# Patient Record
Sex: FEMALE | Race: WHITE | URBAN - METROPOLITAN AREA
[De-identification: names, ages, dates, MRNs, and addresses within clinical notes are randomized per-mention and may not be internally consistent; named-entity substitution may affect disease eponyms.]

---

## 2017-02-23 ENCOUNTER — TELEPHONE (OUTPATIENT)
Dept: TRANSPLANT | Facility: CLINIC | Age: 57
End: 2017-02-23

## 2017-02-23 NOTE — TELEPHONE ENCOUNTER
Call from dialysis social worker who notes that they haven't been sending PRA samples to [facility] on Juliane, they don't have kits.  I verified that Juliane is active on our kidney list and inactive on our SPK list (weight).  I am having  send the ALA order and kits to her.  Message left on Juliane's voicemail about this.  SW also gave me Juliane's current dry weight as of 2-14-17 @ 92 kg so current BMI 34 (OK for kidney, too high for SPK still).  I am handing this case off to Shaila; left message about coordinator change on Juliane's voicemail as well.

## 2017-03-09 ENCOUNTER — RESULTS ONLY (OUTPATIENT)
Dept: OTHER | Facility: CLINIC | Age: 57
End: 2017-03-09

## 2017-03-09 DIAGNOSIS — N18.6 ESRD (END STAGE RENAL DISEASE) (H): ICD-10-CM

## 2017-03-09 DIAGNOSIS — Z76.82 ORGAN TRANSPLANT CANDIDATE: ICD-10-CM

## 2017-03-09 PROCEDURE — 86832 HLA CLASS I HIGH DEFIN QUAL: CPT | Performed by: SURGERY

## 2017-03-09 PROCEDURE — 86833 HLA CLASS II HIGH DEFIN QUAL: CPT | Performed by: SURGERY

## 2017-03-16 LAB — PRA SINGLE ANTIGEN IGG ANTIBODY: NORMAL

## 2017-03-23 LAB
SA1 CELL: NORMAL
SA1 COMMENTS: NORMAL
SA1 HI RISK ABY: NORMAL
SA1 MOD RISK ABY: NORMAL
SA1 TEST METHOD: NORMAL
SA2 CELL: NORMAL
SA2 COMMENTS: NORMAL
SA2 HI RISK ABY UA: NORMAL
SA2 MOD RISK ABY: NORMAL
SA2 TEST METHOD: NORMAL

## 2017-06-08 ENCOUNTER — RESULTS ONLY (OUTPATIENT)
Dept: OTHER | Facility: CLINIC | Age: 57
End: 2017-06-08

## 2017-06-08 DIAGNOSIS — Z76.82 ORGAN TRANSPLANT CANDIDATE: ICD-10-CM

## 2017-06-08 DIAGNOSIS — N18.6 ESRD (END STAGE RENAL DISEASE) (H): ICD-10-CM

## 2017-06-08 PROCEDURE — 86833 HLA CLASS II HIGH DEFIN QUAL: CPT | Performed by: SURGERY

## 2017-06-08 PROCEDURE — 86832 HLA CLASS I HIGH DEFIN QUAL: CPT | Performed by: SURGERY

## 2017-06-14 LAB — PRA SINGLE ANTIGEN IGG ANTIBODY: NORMAL

## 2017-09-06 ENCOUNTER — RESULTS ONLY (OUTPATIENT)
Dept: OTHER | Facility: CLINIC | Age: 57
End: 2017-09-06

## 2017-09-06 DIAGNOSIS — Z76.82 ORGAN TRANSPLANT CANDIDATE: ICD-10-CM

## 2017-09-06 DIAGNOSIS — N18.6 ESRD (END STAGE RENAL DISEASE) (H): ICD-10-CM

## 2017-09-06 PROCEDURE — 86832 HLA CLASS I HIGH DEFIN QUAL: CPT | Performed by: SURGERY

## 2017-09-06 PROCEDURE — 86833 HLA CLASS II HIGH DEFIN QUAL: CPT | Performed by: SURGERY

## 2017-09-12 LAB — PRA SINGLE ANTIGEN IGG ANTIBODY: NORMAL

## 2017-12-07 ENCOUNTER — APPOINTMENT (OUTPATIENT)
Dept: LAB | Facility: CLINIC | Age: 57
End: 2017-12-07
Attending: TRANSPLANT SURGERY
Payer: MEDICARE

## 2017-12-07 ENCOUNTER — RESULTS ONLY (OUTPATIENT)
Dept: OTHER | Facility: CLINIC | Age: 57
End: 2017-12-07

## 2017-12-07 PROCEDURE — 86832 HLA CLASS I HIGH DEFIN QUAL: CPT | Performed by: SURGERY

## 2017-12-07 PROCEDURE — 86833 HLA CLASS II HIGH DEFIN QUAL: CPT | Performed by: SURGERY

## 2017-12-12 DIAGNOSIS — N18.6 ESRD (END STAGE RENAL DISEASE) (H): Primary | ICD-10-CM

## 2017-12-12 DIAGNOSIS — Z76.82 AWAITING ORGAN TRANSPLANT: ICD-10-CM

## 2018-01-08 ENCOUNTER — TELEPHONE (OUTPATIENT)
Dept: TRANSPLANT | Facility: CLINIC | Age: 58
End: 2018-01-08

## 2018-01-10 ENCOUNTER — TELEPHONE (OUTPATIENT)
Dept: TRANSPLANT | Facility: CLINIC | Age: 58
End: 2018-01-10

## 2018-01-30 ENCOUNTER — TELEPHONE (OUTPATIENT)
Dept: TRANSPLANT | Facility: CLINIC | Age: 58
End: 2018-01-30

## 2018-03-08 ENCOUNTER — RESULTS ONLY (OUTPATIENT)
Dept: OTHER | Facility: CLINIC | Age: 58
End: 2018-03-08

## 2018-03-08 DIAGNOSIS — N18.6 ESRD (END STAGE RENAL DISEASE) (H): ICD-10-CM

## 2018-03-08 DIAGNOSIS — Z76.82 AWAITING ORGAN TRANSPLANT: ICD-10-CM

## 2018-03-13 ENCOUNTER — TELEPHONE (OUTPATIENT)
Dept: TRANSPLANT | Facility: CLINIC | Age: 58
End: 2018-03-13

## 2018-03-15 LAB — PRA SINGLE ANTIGEN IGG ANTIBODY: NORMAL

## 2018-03-20 LAB
SA1 CELL: NORMAL
SA1 CELL: NORMAL
SA1 COMMENTS: NORMAL
SA1 COMMENTS: NORMAL
SA1 HI RISK ABY: NORMAL
SA1 HI RISK ABY: NORMAL
SA1 MOD RISK ABY: NORMAL
SA1 MOD RISK ABY: NORMAL
SA1 TEST METHOD: NORMAL
SA1 TEST METHOD: NORMAL
SA2 CELL: NORMAL
SA2 CELL: NORMAL
SA2 COMMENTS: NORMAL
SA2 COMMENTS: NORMAL
SA2 HI RISK ABY UA: NORMAL
SA2 HI RISK ABY UA: NORMAL
SA2 MOD RISK ABY: NORMAL
SA2 MOD RISK ABY: NORMAL
SA2 TEST METHOD: NORMAL
SA2 TEST METHOD: NORMAL
UNOS CPRA: 24

## 2018-04-04 ENCOUNTER — COMMITTEE REVIEW (OUTPATIENT)
Dept: TRANSPLANT | Facility: CLINIC | Age: 58
End: 2018-04-04

## 2018-04-04 ENCOUNTER — TELEPHONE (OUTPATIENT)
Dept: TRANSPLANT | Facility: CLINIC | Age: 58
End: 2018-04-04

## 2018-04-04 NOTE — COMMITTEE REVIEW
Abdominal Patient Discussion Note Transplant Coordinator: Shaila Jackson  Transplant Surgeon:       Referring Physician: Sandrita Cruz    Committee Review Members:  Nephrology Jaime Bañuelos MD, ANITA Landry CNP   Nutrition Noemy Mancia, RD   Transplant Umesh Emerson MD       Additional Discussion Notes and Findings:   Patient placed inactive status on the kidney list on 04/04/18 due to insurance issues; patient continues inactive status on the kidney/pancreas list due to: 1) BMI, and 2) Insurance issues.

## 2018-04-04 NOTE — LETTER
April 4, 2018    Juliane Gong  [address]  [place] [address]      Dear Ms. Gong,    This letter is sent to notify you that your listing status was changed from Active to Inactive on the kidney transplant waitlist at the [facility].  Your status was changed as of April 1, 2018, [facility] no longer has an agreement with your secondary insurance ([place] [employer]) insurance coverage.  Please note [facility] is still in discussions with [employer] to determine whether agreements can be made on a case by case basis.       We still need to be kept informed of any changes such as:    o changes in your insurance coverage  o change in your phone number, address or emergency contact  o significant changes in your health  o significant infections (such as pneumonia or abscesses)  o blood transfusions  o any condition which requires hospitalization or surgery    If you have any financial/insurance questions, please contact Debo Katz at [phone] as she is your assigned [facility]  [employer] .   If you have other questions, please contact your pre-transplant coordinator,  Shaila Jackson RN, at [phone].    Sincerely,        Kidney Transplant Program    Enclosures: UNOS Letter    CC: Tk Castro PCP; Keara Cruz; [facility]

## 2018-04-04 NOTE — TELEPHONE ENCOUNTER
Left voice message for patient to return my call; noted I am placing patient inactive status on the kidney list now, due to insurance issues; that as of 04/01/18 [facility] no longer has an agreement with [place] [employer], so if patient were call for kidney transplant and had the surgery, [facility] can bill patient's primary insurance, but can no longer bill [place] Medicaid, so patient would be responsible for charges not covered by primary insurance.

## 2018-06-05 ENCOUNTER — TRANSFERRED RECORDS (OUTPATIENT)
Dept: HEALTH INFORMATION MANAGEMENT | Facility: CLINIC | Age: 58
End: 2018-06-05

## 2018-06-09 ENCOUNTER — RESULTS ONLY (OUTPATIENT)
Dept: OTHER | Facility: CLINIC | Age: 58
End: 2018-06-09

## 2018-06-09 DIAGNOSIS — Z76.82 AWAITING ORGAN TRANSPLANT: ICD-10-CM

## 2018-06-09 DIAGNOSIS — N18.6 ESRD (END STAGE RENAL DISEASE) (H): ICD-10-CM

## 2018-06-18 LAB
PRA SINGLE ANTIGEN IGG ANTIBODY: NORMAL
SA1 CELL: NORMAL
SA1 COMMENTS: NORMAL
SA1 HI RISK ABY: NORMAL
SA1 MOD RISK ABY: NORMAL
SA1 TEST METHOD: NORMAL
SA2 CELL: NORMAL
SA2 COMMENTS: NORMAL
SA2 HI RISK ABY UA: NORMAL
SA2 MOD RISK ABY: NORMAL
SA2 TEST METHOD: NORMAL

## 2018-10-12 ENCOUNTER — TRANSFERRED RECORDS (OUTPATIENT)
Dept: HEALTH INFORMATION MANAGEMENT | Facility: CLINIC | Age: 58
End: 2018-10-12

## 2019-01-22 ENCOUNTER — TRANSFERRED RECORDS (OUTPATIENT)
Dept: HEALTH INFORMATION MANAGEMENT | Facility: CLINIC | Age: 59
End: 2019-01-22

## 2019-02-07 ENCOUNTER — TELEPHONE (OUTPATIENT)
Dept: TRANSPLANT | Facility: CLINIC | Age: 59
End: 2019-02-07

## 2019-02-07 NOTE — TELEPHONE ENCOUNTER
Call from dialysis social worker Lang at [facility].  He states that Juliane never transferred to a different transplant program when she had to go inactive here because of [place].  Now that they are back to covering transplant at [facility] she is ready to get back on track to get active on our list(s).  He reports no changes in her condition; their unit feels she is a reasonable transplant candidate.  I will pass this message to Shaila to make contact with her.

## 2019-02-11 ENCOUNTER — TELEPHONE (OUTPATIENT)
Dept: TRANSPLANT | Facility: CLINIC | Age: 59
End: 2019-02-11

## 2019-03-19 ENCOUNTER — TELEPHONE (OUTPATIENT)
Dept: TRANSPLANT | Facility: CLINIC | Age: 59
End: 2019-03-19

## 2019-04-02 ENCOUNTER — TELEPHONE (OUTPATIENT)
Dept: TRANSPLANT | Facility: CLINIC | Age: 59
End: 2019-04-02

## 2019-04-04 ENCOUNTER — TRANSFERRED RECORDS (OUTPATIENT)
Dept: HEALTH INFORMATION MANAGEMENT | Facility: CLINIC | Age: 59
End: 2019-04-04

## 2019-04-04 ASSESSMENT — MIFFLIN-ST. JEOR: SCORE: 1372.74

## 2019-04-11 ENCOUNTER — TELEPHONE (OUTPATIENT)
Dept: TRANSPLANT | Facility: CLINIC | Age: 59
End: 2019-04-11

## 2019-04-11 NOTE — TELEPHONE ENCOUNTER
Reviewed patient and I have exchanged voice messages, awaiting return call from patient.  Lang confirms patient dialyzes [place], Sat, starting at 12:10 PM, so I will try to give her a call at dialysis next week, if I don't receive a return call from her.  Also requested Lang have unit fax me copies of most recent provider progress notes and patient's weight (when coming in for treatment).

## 2019-04-15 ENCOUNTER — TELEPHONE (OUTPATIENT)
Dept: TRANSPLANT | Facility: CLINIC | Age: 59
End: 2019-04-15

## 2019-04-15 ENCOUNTER — DOCUMENTATION ONLY (OUTPATIENT)
Dept: TRANSPLANT | Facility: CLINIC | Age: 59
End: 2019-04-15

## 2019-04-15 VITALS — BODY MASS INDEX: 32.42 KG/M2 | HEIGHT: 63 IN | WEIGHT: 182.98 LBS

## 2019-04-15 DIAGNOSIS — E11.9 DIABETES MELLITUS, TYPE 2 (H): ICD-10-CM

## 2019-04-15 DIAGNOSIS — Z76.82 ORGAN TRANSPLANT CANDIDATE: ICD-10-CM

## 2019-04-15 DIAGNOSIS — N18.6 ESRD (END STAGE RENAL DISEASE) (H): ICD-10-CM

## 2019-04-15 NOTE — TELEPHONE ENCOUNTER
"Reviewed patient remains Inactive status on the kidney and kidney/pancreas lists at [facility]; that she continues to accumulate waiting time on the lists, but before she may be considered for Active status, she will need to come to [facility] for waitlist update appointments including but not limited to: 1) Transplant surgeon; 2) ealth cardiology and stress test; 3) Social work; and 4) Lab appointment.  Patient reports she had a mammogram in 2018, sees Dr. Dev Contreras for primary care since Dr. Castro retired.  She also reports being hospitalized last year in [facility] for COPD.  She confirms she uses inhaler for the COPD and \"I did see a lung specialist once between the time Dr. Castro retired and seeing Dr. Contreras.\" Reviewed will have Dr. Contreras's current clinic notes, mammogram report and 2018 hospital discharge summary (COPD).  Will send a request to our  to contact Juliane to schedule waitlist return/update appointments and tests.  Patient verbalizes agreement with this plan.  "

## 2019-04-17 ENCOUNTER — MEDICAL CORRESPONDENCE (OUTPATIENT)
Dept: TRANSPLANT | Facility: CLINIC | Age: 59
End: 2019-04-17

## 2019-06-06 ENCOUNTER — TRANSFERRED RECORDS (OUTPATIENT)
Dept: HEALTH INFORMATION MANAGEMENT | Facility: CLINIC | Age: 59
End: 2019-06-06

## 2019-10-22 ENCOUNTER — TRANSFERRED RECORDS (OUTPATIENT)
Dept: HEALTH INFORMATION MANAGEMENT | Facility: CLINIC | Age: 59
End: 2019-10-22

## 2019-10-23 ENCOUNTER — TRANSFERRED RECORDS (OUTPATIENT)
Dept: HEALTH INFORMATION MANAGEMENT | Facility: CLINIC | Age: 59
End: 2019-10-23

## 2019-12-16 ENCOUNTER — TELEPHONE (OUTPATIENT)
Dept: TRANSPLANT | Facility: CLINIC | Age: 59
End: 2019-12-16

## 2019-12-19 ENCOUNTER — TRANSFERRED RECORDS (OUTPATIENT)
Dept: HEALTH INFORMATION MANAGEMENT | Facility: CLINIC | Age: 59
End: 2019-12-19

## 2020-01-09 ENCOUNTER — TELEPHONE (OUTPATIENT)
Dept: TRANSPLANT | Facility: CLINIC | Age: 60
End: 2020-01-09

## 2020-01-09 NOTE — TELEPHONE ENCOUNTER
Reviewed with Lang that I left a voice message on 12/16/19, but patient hasn't yet returned my call.  Lang with request the patient return my call (provided my direct contact information).

## 2020-01-13 ENCOUNTER — TELEPHONE (OUTPATIENT)
Dept: TRANSPLANT | Facility: CLINIC | Age: 60
End: 2020-01-13

## 2020-01-13 NOTE — TELEPHONE ENCOUNTER
"Reviewed with patient that I made attempts to contact her and left voice messages, but when dialysis MSW called me last week, learned patient had a different phone number.  Confirmed patient's current address.  Explained received dialysis progress notes that document patient periodically misses treatments.   Patient reports she has requested a different chair on multiple occasions as current chair is \"very painful so sit in\" for dialysis treatments, \"So I called them today and told them I'm not coming today.\"  Reviewed the importance that patient not miss dialysis treatments as this will be a concern for after transplant; whether patient will follow transplant provider recommendations.  Encouraged patient to discuss with Lang (dialysis MSW) as patient reports she did not discuss different dialysis chair with Lang last week. Since patient reports having scheduled appointment with Dr. Contreras (PCP) on 01/20/2020, will have this visit note and prior visit note from 2019 note requested after 01/20/2020.   After 01/20/2020,will send scheduling request to scheduling team to contact patient to schedule return waitlist update appointments at [facility]. Encouraged patient to contact me with questions, concerns, or additional contact information changes.  Patient verbalizes she understands all information and agrees with current plan.   "

## 2020-07-29 ENCOUNTER — TRANSFERRED RECORDS (OUTPATIENT)
Dept: HEALTH INFORMATION MANAGEMENT | Facility: CLINIC | Age: 60
End: 2020-07-29

## 2020-09-28 ENCOUNTER — TELEPHONE (OUTPATIENT)
Dept: TRANSPLANT | Facility: CLINIC | Age: 60
End: 2020-09-28

## 2020-09-28 NOTE — TELEPHONE ENCOUNTER
Returned Lang's call.  Left voice message that I need to have records from patient's PCP re-requested.  Also requested Lang have unit staff fax copy of current nephrologist progress note and dialysis attendance report for the past few months.  Previously, patient was missing dialysis treatments as patient reported it was too painful to sit in the dialysis chair.

## 2021-02-23 ENCOUNTER — TELEPHONE (OUTPATIENT)
Dept: TRANSPLANT | Facility: CLINIC | Age: 61
End: 2021-02-23

## 2021-02-23 ENCOUNTER — TRANSFERRED RECORDS (OUTPATIENT)
Dept: HEALTH INFORMATION MANAGEMENT | Facility: CLINIC | Age: 61
End: 2021-02-23

## 2021-02-23 NOTE — TELEPHONE ENCOUNTER
Patient reports still interested in transplant, but has had an ongoing cold and vomiting; reports has an appointment with her primary care clinic on 02/26/2021.  Requested patient have this clinic fax copies of most recent mammogram and colonoscopy reports as well as 02/26/2021 clinic notes.  Reviewed once I have insurance verification from patient's assigned [facility], will send scheduling request for return in-person visits with transplant surgeon and social work team member as well as a lab appointment.  Provided patient with living donor website/contact information for potential donors to register.  Also provided my contact information additional questions.

## 2021-02-25 ENCOUNTER — TRANSFERRED RECORDS (OUTPATIENT)
Dept: HEALTH INFORMATION MANAGEMENT | Facility: CLINIC | Age: 61
End: 2021-02-25
Payer: MEDICARE

## 2021-02-25 DIAGNOSIS — E11.9 DIABETES MELLITUS, TYPE 2 (H): ICD-10-CM

## 2021-02-25 DIAGNOSIS — Z76.82 ORGAN TRANSPLANT CANDIDATE: ICD-10-CM

## 2021-02-25 DIAGNOSIS — N18.6 ESRD (END STAGE RENAL DISEASE) (H): ICD-10-CM

## 2021-02-26 ENCOUNTER — TRANSFERRED RECORDS (OUTPATIENT)
Dept: HEALTH INFORMATION MANAGEMENT | Facility: CLINIC | Age: 61
End: 2021-02-26

## 2021-03-08 ENCOUNTER — TELEPHONE (OUTPATIENT)
Dept: TRANSPLANT | Facility: CLINIC | Age: 61
End: 2021-03-08

## 2021-03-08 NOTE — TELEPHONE ENCOUNTER
Called patient to review scheduling team has been trying to reach her, but no answer and no voice mail.  Patient reports she does have voice mail.  She also notes best to call her after 10AM on non-dialysis days.  Disconnected call with patient and called her number back.  I was able to leave the patient a voice message.  Request to scheduling team to call patient after 10AM.

## 2021-04-08 ENCOUNTER — TELEPHONE (OUTPATIENT)
Dept: TRANSPLANT | Facility: CLINIC | Age: 61
End: 2021-04-08

## 2021-04-08 NOTE — TELEPHONE ENCOUNTER
Left voice message for patient requesting she return scheduling team's call as voice messages were left by scheduling March 9th, March 24th, and April 8th.  Also left my contact information for questions.

## 2021-06-21 ENCOUNTER — TELEPHONE (OUTPATIENT)
Dept: TRANSPLANT | Facility: CLINIC | Age: 61
End: 2021-06-21

## 2021-06-21 NOTE — TELEPHONE ENCOUNTER
Left voice message for patient.  Is she still interested in scheduling return waitlist?  Requested return call.

## 2021-07-22 ENCOUNTER — TELEPHONE (OUTPATIENT)
Dept: TRANSPLANT | Facility: CLINIC | Age: 61
End: 2021-07-22

## 2021-07-22 NOTE — TELEPHONE ENCOUNTER
Reviewed our scheduling team and I have made several attempts to contact patient, but she hasn't returned calls.  Would like to confirm whether patient remains interested in transplant or not.  Kerri reports she will see the patient later today and will discuss with her.  Provided my contact information.

## 2021-08-05 ENCOUNTER — TELEPHONE (OUTPATIENT)
Dept: TRANSPLANT | Facility: CLINIC | Age: 61
End: 2021-08-05

## 2021-09-27 ENCOUNTER — TELEPHONE (OUTPATIENT)
Dept: TRANSPLANT | Facility: CLINIC | Age: 61
End: 2021-09-27

## 2021-09-27 NOTE — TELEPHONE ENCOUNTER
Patient reports she is still interested in having a transplant; reports she has been dealing with back pain and was hospitalized the week befor e last at [facility], but doesn't respond when I noted I have left several voice messages for her over the past few months.  Patient confirms she had a colonoscopy this year in [place], but doesn't remember the exact date or month and that she has a hospital follow-up appt with her PCP on 10/04/2021.  Requested patient have PCP fax a copy of 10/04/2021 visit notes and provided the transplant office fax number.  Noted I will send another message to our scheduling team to try to call the patient again, but noted she needs to return their calls if message are left. Otherwise, we can't move forward with return waitlist appointments.  Patient verbalizes she understands all information and is agreeable with this plan.

## 2021-12-13 DIAGNOSIS — Z76.82 ORGAN TRANSPLANT CANDIDATE: ICD-10-CM

## 2021-12-13 DIAGNOSIS — G47.33 OBSTRUCTIVE SLEEP APNEA: ICD-10-CM

## 2021-12-13 DIAGNOSIS — N18.6 END STAGE RENAL DISEASE (H): ICD-10-CM

## 2021-12-13 DIAGNOSIS — I10 ESSENTIAL HYPERTENSION: ICD-10-CM

## 2021-12-13 DIAGNOSIS — E11.9 DIABETES MELLITUS, TYPE 2 (H): ICD-10-CM

## 2021-12-14 ENCOUNTER — TELEPHONE (OUTPATIENT)
Dept: TRANSPLANT | Facility: CLINIC | Age: 61
End: 2021-12-14
Payer: MEDICARE

## 2022-01-18 ENCOUNTER — TELEPHONE (OUTPATIENT)
Dept: TRANSPLANT | Facility: CLINIC | Age: 62
End: 2022-01-18
Payer: MEDICARE

## 2022-01-24 ENCOUNTER — TELEPHONE (OUTPATIENT)
Dept: TRANSPLANT | Facility: CLINIC | Age: 62
End: 2022-01-24
Payer: MEDICARE

## 2022-01-24 NOTE — TELEPHONE ENCOUNTER
"Called patient, but unable to leave a message as recording, \"The mailbox is full and cannot accept any messages at this time.  Goodbye.\"  "

## 2022-01-25 ENCOUNTER — TELEPHONE (OUTPATIENT)
Dept: TRANSPLANT | Facility: CLINIC | Age: 62
End: 2022-01-25
Payer: MEDICARE

## 2022-01-25 NOTE — TELEPHONE ENCOUNTER
Unit doesn't have a portable phone to give to the patient, so I provided my contact information and Jaci reports she will give the patient a message to call me.

## 2022-01-25 NOTE — TELEPHONE ENCOUNTER
"Called patient.  Unable to leave a voice message per recording, \"The mailbox is full and cannot accept messages at this time.  Goodbye.\"  "

## 2022-02-14 ENCOUNTER — TELEPHONE (OUTPATIENT)
Dept: TRANSPLANT | Facility: CLINIC | Age: 62
End: 2022-02-14
Payer: MEDICARE

## 2022-02-14 NOTE — TELEPHONE ENCOUNTER
"Reviewed attempts made to contact patient by our scheduling team and I also contacted the patient unable to leave voice message. Patient reports her phone wasn't working and she wasn't able to receive calls. She also notes she will be moving to a different city at the end of this month, but \"I don't have the address in front of me.\"  She reports she was hospitalized last month at [facility] for COPD, but doesn't recall the specific dates, \"I was in from a Monday through Sunday.\"  Reviewed will need to submit a request for these records.  Patient reports with moving, she is not ready to schedule return waitlist appointments, so will plan to contact her in March for an update.  Patient verbalizes agreement with this plan.   "
Abdomen soft, nontender, nondistended, bowel sounds present in all 4 quadrants.

## 2022-02-23 ENCOUNTER — DOCUMENTATION ONLY (OUTPATIENT)
Dept: TRANSPLANT | Facility: CLINIC | Age: 62
End: 2022-02-23

## 2022-03-14 ENCOUNTER — TELEPHONE (OUTPATIENT)
Dept: TRANSPLANT | Facility: CLINIC | Age: 62
End: 2022-03-14
Payer: MEDICARE

## 2022-03-14 NOTE — TELEPHONE ENCOUNTER
"\"We're sorry your call cannot be completed at this time.  Please hang-up and try your call again later.\"  "

## 2022-03-16 ENCOUNTER — TELEPHONE (OUTPATIENT)
Dept: TRANSPLANT | Facility: CLINIC | Age: 62
End: 2022-03-16
Payer: MEDICARE

## 2022-03-16 NOTE — TELEPHONE ENCOUNTER
Keyla reports she doesn't have a current phone number for the patient since the patient moved and is now dialyzing at the [facility] unit in [place], [place], phone number [phone].

## 2022-03-16 NOTE — TELEPHONE ENCOUNTER
After Visit Summary   11/26/2018    Mely Guerra    MRN: [id number]           Patient Information     Date Of Birth          1942        Visit Information        Provider Department      11/26/2018 9:15 AM Juan York MD [facility]        Today's Diagnoses     Palpitations    -  1    Wenckebach second degree AV block           Follow-ups after your visit        Future tests that were ordered for you today     Open Future Orders        Priority Expected Expires Ordered    Holter Monitor 24 hour - Adult Routine 12/3/2018 11/26/2019 11/26/2018    Exercise Stress Echocardiogram Routine 12/3/2018 11/26/2019 11/26/2018            Who to contact     If you have questions or need follow up information about today's clinic visit or your schedule please contact [facility] directly at [phone].  Normal or non-critical lab and imaging results will be communicated to you by MyChart, letter or phone within 4 business days after the clinic has received the results. If you do not hear from us within 7 days, please contact the clinic through [employer] or phone. If you have a critical or abnormal lab result, we will notify you by phone as soon as possible.  Submit refill requests through [employer] or call your pharmacy and they will forward the refill request to us. Please allow 3 business days for your refill to be completed.          Additional Information About Your Visit        MyChart Information     [employer] gives you secure access to your electronic health record. If you see a primary care provider, you can also send messages to your care team and make appointments. If you have questions, please call your primary care clinic.  If you do not have a primary care provider, please call [phone] and they will assist you.        Care EveryWhere ID     This is your Care EveryWhere ID. This could be used by other  Sylwia reports patient has been dialyzing at this unit for a few weeks, has missed 2 treatments=1 related to bad weather and the 2nd as patient reported feeling ill.  Sylwia also reports from patient's previous dialysis unit, there were some challenges with dialysis and medication compliance.  Sylwia will have the unit  contact me to review.    organizations to access your [place] medical records  [id number]        Your Vitals Were     Pulse Pulse Oximetry BMI (Body Mass Index)             64 99% 21.58 kg/m2          Blood Pressure from Last 3 Encounters:   11/26/18 135/74   11/21/18 112/64   10/31/18 119/83    Weight from Last 3 Encounters:   11/26/18 58.8 kg (129 lb 11.2 oz)   11/21/18 58.5 kg (129 lb)   10/31/18 56.2 kg (124 lb)              We Performed the Following     EKG 12-lead complete w/read - Clinics (performed today)        Primary Care Provider Office Phone # Fax #    Tiffany Messer [phone] [address] [address]        Equal Access to Services     CIRILO BAUER : Hadii chris davis hadasho Sogeovanna, waaxda luqadaha, qaybta kaalmada adeegyada, erik greene . So [facility] [phone].    ATENCIÓN: Si habla español, tiene a mcfadden disposición servicios gratuitos de asistencia lingüística. Jostin al [phone].    We comply with applicable federal civil rights laws and [place] laws. We do not discriminate on the basis of race, color, national origin, age, disability, sex, sexual orientation, or gender identity.            Thank you!     Thank you for choosing [facility]  for your care. Our goal is always to provide you with excellent care. Hearing back from our patients is one way we can continue to improve our services. Please take a few minutes to complete the written survey that you may receive in the mail after your visit with us. Thank you!             Your Updated Medication List - Protect others around you: Learn how to safely use, store and throw away your medicines at www.disposemymeds.org.          This list is accurate as of 11/26/18 10:03 AM.  Always use your most recent med list.                   Brand Name Dispense Instructions for use Diagnosis    ADVIL PM PO      Take 2 tablets by mouth At Bedtime        ALEVE PO       Take 220 mg by mouth Takes every AM and PM when not taking ibuprofen.        amLODIPine 10 MG tablet    NORVASC    90 tablet    Take 1 tablet (10 mg) by mouth daily    Essential hypertension with goal blood pressure less than 140/90       CALCIUM CITRATE + D PO      Take 2 tablets by mouth 2 times daily        doxycycline monohydrate 100 MG capsule    MONDOXYNE NL    14 capsule    Take bid for 7 days    LRTI (lower respiratory tract infection), Acute sinusitis, recurrence not specified, unspecified location       FISH OIL PO      1,000 mg 2 times daily        fluticasone 110 MCG/ACT inhaler    FLOVENT HFA    1 Inhaler    Inhale 1 puff into the lungs 2 times daily    Wheezing       fluticasone 50 MCG/ACT spray    FLONASE    48 g    Spray 2 sprays into both nostrils daily    Other allergic rhinitis       levothyroxine 100 MCG tablet    SYNTHROID/LEVOTHROID    90 tablet    TAKE 1 TABLET EVERY DAY    Acquired hypothyroidism       losartan 100 MG tablet    COZAAR    90 tablet    Take 1 tablet (100 mg) by mouth daily    Essential hypertension with goal blood pressure less than 140/90       * PRESERVISION AREDS 2 PO      Take 1 tablet by mouth 2 times daily        * MULTIVITAMIN ADULT PO      Take 1 tablet by mouth daily        triamterene-hydrochlorothiazide 37.5-25 MG per capsule    DYAZIDE    90 capsule    TAKE 1 CAPSULE EVERY DAY    Essential hypertension with goal blood pressure less than 140/90       ZANTAC 150 MAXIMUM STRENGTH PO           * Notice:  This list has 2 medication(s) that are the same as other medications prescribed for you. Read the directions carefully, and ask your doctor or other care provider to review them with you.

## 2022-03-17 ENCOUNTER — TRANSFERRED RECORDS (OUTPATIENT)
Dept: HEALTH INFORMATION MANAGEMENT | Facility: CLINIC | Age: 62
End: 2022-03-17
Payer: MEDICARE

## 2022-03-17 ENCOUNTER — TELEPHONE (OUTPATIENT)
Dept: TRANSPLANT | Facility: CLINIC | Age: 62
End: 2022-03-17
Payer: MEDICARE

## 2022-03-17 NOTE — TELEPHONE ENCOUNTER
Chitra reports patient has moved in with her niece, Agata as patient was hospitalized and may not have been taking care of herself as well; that Agata is very involved with her care.  Chitra also reports patient has established care with new PCP, Dr. Stef Mcguire, [facility] and had an appointment earlier today to assist with referral for additional home health services.  Chitra reports patient is currently using a walker and current body mass index is 24.  Explained patient remains inactive status on the kidney and kidney/pancreas transplant lists.  When I last spoke with the patient, she wanted to delay scheduling updated appointments with our transplant team until she completed her move.  Chitra noted the phone number she has for the patient is [phone].  Noted I will try to contact patient at this number and if I'm unable to reach, will attempt to contact Agata.  Chitra reports Agata does work the 2nd and 3rd shifts.

## 2022-03-22 ENCOUNTER — TELEPHONE (OUTPATIENT)
Dept: TRANSPLANT | Facility: CLINIC | Age: 62
End: 2022-03-22
Payer: MEDICARE

## 2022-03-22 NOTE — TELEPHONE ENCOUNTER
Left voice message requesting Agata have General Blood contact me as I tried calling General Blood's cell phone, but was unable to leave a voice message.

## 2022-03-22 NOTE — TELEPHONE ENCOUNTER
Agata reports she is managing Juliane's mail as she reports Juliane wasn't opening her mail and not paying some of her bills; that she forgets things.  Noted I will have a consent form sent to Juliane in care of Agata for Juliane to sign and list names of people she authorizes us to contact and share medical information.  Agata reports she will watch for the information.

## 2022-05-05 ENCOUNTER — TELEPHONE (OUTPATIENT)
Dept: TRANSPLANT | Facility: CLINIC | Age: 62
End: 2022-05-05
Payer: MEDICARE

## 2022-05-18 ENCOUNTER — TELEPHONE (OUTPATIENT)
Dept: TRANSPLANT | Facility: CLINIC | Age: 62
End: 2022-05-18
Payer: MEDICARE

## 2022-05-18 NOTE — TELEPHONE ENCOUNTER
Agata reports Juliane is currently hospitalized with low oxygen levels and may be discharging with supplemental oxygen.  Reviewed I am able to follow Juliane's progress in our records sharing system for the current hospitalization.   Explained before scheduling return appointments with our transplant providers, Juliane will need to be independent of supplemental oxygen.  Also explained before being considered for transplant, Juliane will need to be current with mammograms, pap smears, and colonoscopies.  As Agata reports Juliane had a stress test at [facility] a few weeks ago, requested Agata contact [facility] and ask that copy of the stress test report be faxed to the transplant office.  Will plan to contact Agata/Juliane in a month or so for an update, but encouraged Agata to contact me before that time with questions or if Juliane is not needing supplemental oxygen.  Agata verbalizes agreement with this plan.

## 2022-06-27 ENCOUNTER — TELEPHONE (OUTPATIENT)
Dept: TRANSPLANT | Facility: CLINIC | Age: 62
End: 2022-06-27

## 2022-06-27 NOTE — TELEPHONE ENCOUNTER
Agata reports Juliane wants to be removed from the transplant waitlists; that Juliane wishes continue as she is; that this has been discussed with her dialysis unit.  Agata reports Juliane dialyzes between 9:20 AM - 1:20 PM Tues/Thurs/Sat, so I will call her during dialysis to review her request and also contact her nephrologist.  Once have confirmed, will review Juliane's request with the transplant committee.

## 2022-06-28 ENCOUNTER — TELEPHONE (OUTPATIENT)
Dept: TRANSPLANT | Facility: CLINIC | Age: 62
End: 2022-06-28

## 2022-06-28 NOTE — TELEPHONE ENCOUNTER
Patient reports she is no longer interested in having a transplant, but unsure whether her nephrologist is aware.    I then spoke with SANDRA Murphy who helps with transplant coordination and also confirms Dr. Rogers may not be aware of patient's wishes, but likely will agree.  Jeffrey will send an e-mail to Dr. Rogers, CC the dialysis social worker and myself regarding patient's request not to have a transplant.  Will watch for Dr. Rogers's reply and then review with the multi-disciplinary transplant committee for removal from the waitlist.

## 2022-08-25 ENCOUNTER — TELEPHONE (OUTPATIENT)
Dept: TRANSPLANT | Facility: CLINIC | Age: 62
End: 2022-08-25

## 2022-08-25 NOTE — TELEPHONE ENCOUNTER
Left voice message requesting return call from unit.  I sent e-mail to providers per request to review whether in agreement with patient's request to not move forward with kidney or kidney/pancreas transplant, but haven't received a response.

## 2022-08-30 ENCOUNTER — TELEPHONE (OUTPATIENT)
Dept: TRANSPLANT | Facility: CLINIC | Age: 62
End: 2022-08-30

## 2022-08-30 NOTE — TELEPHONE ENCOUNTER
Sylwia reports Dr. Rogers is aware of patient's request to be removed from the transplant lists and is okay with this request.

## 2022-08-31 ENCOUNTER — COMMITTEE REVIEW (OUTPATIENT)
Dept: TRANSPLANT | Facility: CLINIC | Age: 62
End: 2022-08-31

## 2022-08-31 NOTE — LETTER
August 31, 2022    Julianeliban Gong   [address] [address]      Dear Ms. Gong,    The purpose of this letter is to let you know the [facility] Multi-Disciplinary Selection Team made the decision to remove you from the kidney and  kidney and pancreas transplant lisst.  This is because you have reported you are not interested in having a transplant and have requested to be removed from the transplant waitlists.   Important things you should know:    If you would like to discuss the decision, you may call [phone], option 5 and ask to speak to your transplant coordinator.    We recommend that you continue to follow up with your primary care and referring physicians in order to manage your health concerns.  Enclosed is a letter from [facility] which describes the services offered to patients by [facility] and the Organ Procurement and Transplantation Network.  Thank you for allowing us to participate in your care.  We wish you well.    Sincerely,  Kidney and Pancreas Transplant Team  ealth/Brice    Enclosure:  OS Letter   CC:   Care Team              The Organ Procurement and Transplantation Network  Toll-free patient services line:     Your resource for organ transplant information    If you have a question regarding your own medical care, you always should call your transplant hospital first. However, for general organ transplant-related information, you can call the Organ Procurement and Transplantation Network (OPTN) toll-free patient services line at [phone]. Anyone, including potential transplant candidates, candidates, recipients, family members, friends, living donors, and donor family members, can call this number to:          Talk about organ donation, living donation, the transplant process, the donation process, and transplant policies.    Get a free patient information kit with helpful booklets, waiting list and transplant information, and a list of all transplant  hospitals.    Ask questions about the OPTN website (https://optn.transplant.hrsa.gov/), the United Network for Organ Sharing s (UNOS) website (https://unos.org/), or the UNOS website for living donors and transplant recipients. (https://www.transplantliving.org/).    Learn how the OPTN can help you.    Talk about any concerns that you may have with a transplant hospital.    The [facility] transplant system, the OPTN, is managed under federal contract by the [employer] Network for Organ Sharing (UNOS), which is a non-profit charitable organization. The OPTN helps create and define organ sharing policies that make the best use of donated organs. This process continuously evaluating new advances and discoveries so policies can be adapted to best serve patients waiting for transplants. To do so, the OPTN works closely with transplant professionals, transplant patients, transplant candidates, donor families, living donors, and the public. All transplant programs and organ procurement organizations throughout the country are OPTN members and are obligated to follow the policies the OPTN creates for allocating organs.    The OPTN also is responsible for:      Providing educational material for patients, the public, and professionals.    Raising awareness of the need for donated organs and tissue.    Coordinating organ procurement, matching, and placement.    Collecting information about every organ transplant and donation that occurs in the [place].                          Remember, you should contact your transplant hospital directly if you have questions or concerns about your own medical care including medical records, work-up progress, and test results.    We are not your transplant hospital, and our staff will not be able to answer questions about your case, so please keep your transplant hospital s phone number handy.    However, while you research your transplant needs and learn as much as you can about  transplantation and donation, we welcome your call to our toll-free patient services line at [phone].          Updated 4/1/2019

## 2022-08-31 NOTE — COMMITTEE REVIEW
Abdominal Patient Discussion Note Transplant Coordinator: Shaila Jackson  Transplant Surgeon:       Referring Physician: Sandrita Cruz    Committee Review Members:  Nephrology Eduar Lindsey MD, Irma Peacock MD, Jorge Alberto Baker MD   Pharmacy Jorje Almanza, [facility]    - Clinical Gini Estelle Rahman, [facility]   Transplant Khadijah Sol PA-C, Shaila Jackson, RN, Ernestine Angel, RN, Astrid Dent, SANDRA, Umesh Emerson MD, Mae Reed, SANDRA, Jessica Cummings RN   Transplant Surgery Umesh Emerson MD       Additional Discussion Notes and Findings:   Review of patient's current medical information, patient request to not have an organ transplant, and dialysis unit reported primary nephrologist is aware of patient's request and does not object.  Committee in agreement with removing patient from the kidney and kidney/pancreas transplant lists.     08/31/2022 EG left voice message for Agata arias that patient's name has been removed from the [facility] and kidney/pancreas waitlists per patient request.  Sending waitlist removal letter and requested return call should Agata or Juliane have any questions.